# Patient Record
Sex: MALE | Race: WHITE | Employment: UNEMPLOYED | URBAN - METROPOLITAN AREA
[De-identification: names, ages, dates, MRNs, and addresses within clinical notes are randomized per-mention and may not be internally consistent; named-entity substitution may affect disease eponyms.]

---

## 2021-02-17 ENCOUNTER — HOSPITAL ENCOUNTER (EMERGENCY)
Age: 56
Discharge: HOME | End: 2021-02-17
Attending: EMERGENCY MEDICINE
Payer: COMMERCIAL

## 2021-02-17 VITALS
RESPIRATION RATE: 18 BRPM | HEART RATE: 89 BPM | WEIGHT: 200 LBS | BODY MASS INDEX: 31.39 KG/M2 | SYSTOLIC BLOOD PRESSURE: 126 MMHG | DIASTOLIC BLOOD PRESSURE: 84 MMHG | OXYGEN SATURATION: 96 % | TEMPERATURE: 98.5 F | HEIGHT: 67 IN

## 2021-02-17 DIAGNOSIS — R11.0 NAUSEA: Primary | ICD-10-CM

## 2021-02-17 LAB
A/G RATIO: 1.8 (ref 1.1–2.2)
ALBUMIN SERPL-MCNC: 4.8 G/DL (ref 3.4–5)
ALP BLD-CCNC: 53 U/L (ref 40–129)
ALT SERPL-CCNC: 42 U/L (ref 10–40)
ANION GAP SERPL CALCULATED.3IONS-SCNC: 9 MMOL/L (ref 3–16)
AST SERPL-CCNC: 23 U/L (ref 15–37)
BASOPHILS ABSOLUTE: 0.1 K/UL (ref 0–0.2)
BASOPHILS RELATIVE PERCENT: 0.8 %
BILIRUB SERPL-MCNC: 0.5 MG/DL (ref 0–1)
BILIRUBIN URINE: NEGATIVE
BLOOD, URINE: NEGATIVE
BUN BLDV-MCNC: 13 MG/DL (ref 7–20)
CALCIUM SERPL-MCNC: 9.1 MG/DL (ref 8.3–10.6)
CHLORIDE BLD-SCNC: 105 MMOL/L (ref 99–110)
CLARITY: CLEAR
CO2: 26 MMOL/L (ref 21–32)
COLOR: YELLOW
CREAT SERPL-MCNC: 1.2 MG/DL (ref 0.9–1.3)
EKG ATRIAL RATE: 76 BPM
EKG DIAGNOSIS: NORMAL
EKG P AXIS: 47 DEGREES
EKG P-R INTERVAL: 170 MS
EKG Q-T INTERVAL: 370 MS
EKG QRS DURATION: 66 MS
EKG QTC CALCULATION (BAZETT): 416 MS
EKG R AXIS: -12 DEGREES
EKG T AXIS: 32 DEGREES
EKG VENTRICULAR RATE: 76 BPM
EOSINOPHILS ABSOLUTE: 0.3 K/UL (ref 0–0.6)
EOSINOPHILS RELATIVE PERCENT: 2.7 %
GFR AFRICAN AMERICAN: >60
GFR NON-AFRICAN AMERICAN: >60
GLOBULIN: 2.7 G/DL
GLUCOSE BLD-MCNC: 145 MG/DL (ref 70–99)
GLUCOSE URINE: NEGATIVE MG/DL
HCT VFR BLD CALC: 43.4 % (ref 40.5–52.5)
HEMOGLOBIN: 14.7 G/DL (ref 13.5–17.5)
KETONES, URINE: NEGATIVE MG/DL
LEUKOCYTE ESTERASE, URINE: NEGATIVE
LIPASE: 32 U/L (ref 13–60)
LYMPHOCYTES ABSOLUTE: 2.8 K/UL (ref 1–5.1)
LYMPHOCYTES RELATIVE PERCENT: 30 %
MCH RBC QN AUTO: 31.1 PG (ref 26–34)
MCHC RBC AUTO-ENTMCNC: 34 G/DL (ref 31–36)
MCV RBC AUTO: 91.6 FL (ref 80–100)
MICROSCOPIC EXAMINATION: NORMAL
MONOCYTES ABSOLUTE: 0.8 K/UL (ref 0–1.3)
MONOCYTES RELATIVE PERCENT: 8.5 %
NEUTROPHILS ABSOLUTE: 5.4 K/UL (ref 1.7–7.7)
NEUTROPHILS RELATIVE PERCENT: 58 %
NITRITE, URINE: NEGATIVE
PDW BLD-RTO: 13 % (ref 12.4–15.4)
PH UA: 6.5 (ref 5–8)
PLATELET # BLD: 248 K/UL (ref 135–450)
PMV BLD AUTO: 7.8 FL (ref 5–10.5)
POTASSIUM SERPL-SCNC: 3.9 MMOL/L (ref 3.5–5.1)
PROTEIN UA: NEGATIVE MG/DL
RBC # BLD: 4.74 M/UL (ref 4.2–5.9)
SODIUM BLD-SCNC: 140 MMOL/L (ref 136–145)
SPECIFIC GRAVITY UA: 1.02 (ref 1–1.03)
TOTAL PROTEIN: 7.5 G/DL (ref 6.4–8.2)
TROPONIN: <0.01 NG/ML
URINE REFLEX TO CULTURE: NORMAL
URINE TYPE: NORMAL
UROBILINOGEN, URINE: 0.2 E.U./DL
WBC # BLD: 9.3 K/UL (ref 4–11)

## 2021-02-17 PROCEDURE — 81003 URINALYSIS AUTO W/O SCOPE: CPT

## 2021-02-17 PROCEDURE — 85025 COMPLETE CBC W/AUTO DIFF WBC: CPT

## 2021-02-17 PROCEDURE — 99284 EMERGENCY DEPT VISIT MOD MDM: CPT

## 2021-02-17 PROCEDURE — 93005 ELECTROCARDIOGRAM TRACING: CPT | Performed by: EMERGENCY MEDICINE

## 2021-02-17 PROCEDURE — 93010 ELECTROCARDIOGRAM REPORT: CPT | Performed by: INTERNAL MEDICINE

## 2021-02-17 PROCEDURE — 6360000002 HC RX W HCPCS: Performed by: EMERGENCY MEDICINE

## 2021-02-17 PROCEDURE — 84484 ASSAY OF TROPONIN QUANT: CPT

## 2021-02-17 PROCEDURE — 80053 COMPREHEN METABOLIC PANEL: CPT

## 2021-02-17 PROCEDURE — 96374 THER/PROPH/DIAG INJ IV PUSH: CPT

## 2021-02-17 PROCEDURE — 83690 ASSAY OF LIPASE: CPT

## 2021-02-17 RX ORDER — ONDANSETRON 4 MG/1
4 TABLET, ORALLY DISINTEGRATING ORAL EVERY 8 HOURS PRN
Qty: 20 TABLET | Refills: 0 | Status: SHIPPED | OUTPATIENT
Start: 2021-02-17

## 2021-02-17 RX ORDER — ONDANSETRON 2 MG/ML
4 INJECTION INTRAMUSCULAR; INTRAVENOUS ONCE
Status: COMPLETED | OUTPATIENT
Start: 2021-02-17 | End: 2021-02-17

## 2021-02-17 RX ADMIN — ONDANSETRON 4 MG: 2 INJECTION INTRAMUSCULAR; INTRAVENOUS at 03:34

## 2021-02-17 ASSESSMENT — ENCOUNTER SYMPTOMS
SORE THROAT: 0
SHORTNESS OF BREATH: 0
NAUSEA: 1
EYE PAIN: 0
DIARRHEA: 0
ABDOMINAL PAIN: 0
VOMITING: 0

## 2021-02-17 NOTE — ED PROVIDER NOTES
[address] [facility]  ED  eMERGENCY dEPARTMENTeNCOUnter      Pt Name: Simran Do  MRN: [id number]  [place] 1965  Date of evaluation: 2/17/2021  Provider: Nilsa Davenport MD    CHIEF COMPLAINT       Chief Complaint   Patient presents with    Abdominal Pain     states has not been able to eat since around 1800 tonight. \" has not had an appetite. \" states stomach does not hurt anymore but was bad earlier. denies vmoiting. given 4 mg iv zofran         HISTORY OF PRESENT ILLNESS   (Location/Symptom, Timing/Onset,Context/Setting, Quality, Duration, Modifying Factors, Severity)  Note limiting factors. Simran Do is a 48 y.o. male who presents to the emergency department for nausea. The patient states that he has been nauseated since this morning which has affected his ability to eat. Because he is diabetic when he is unable to eat he feels like he gets numbness to his back. The patient is concerned because throughout the day he has eaten less than normal whenever he is trying to eat he became more nauseated. He is not having any abdominal pain he has not vomited has not been sick and has no other complaints. Nursing notes were reviewed. REVIEW OF SYSTEMS    (2-9 systems for level 4, 10 or more for level 5)     Review of Systems   Constitutional: Negative for fatigue and fever. HENT: Negative for sore throat. Eyes: Negative for pain. Respiratory: Negative for shortness of breath. Cardiovascular: Negative for chest pain. Gastrointestinal: Positive for nausea. Negative for abdominal pain, diarrhea and vomiting. Endocrine: Negative for polyuria. Genitourinary: Negative for dysuria. Musculoskeletal: Negative for myalgias. Skin: Negative for rash. Neurological:        Tingling to the back   Hematological: Does not bruise/bleed easily. Positive and pertinent negative as per HPI.   Except as noted above in the ROS, all other systems were reviewed and were negative. PAST MEDICAL HISTORY     Past Medical History:   Diagnosis Date    Diabetes mellitus ([facility] [address]          SURGICALHISTORY       Past Surgical History:   Procedure Laterality Date    CATARACT REMOVAL      EYE SURGERY      cataract         CURRENT MEDICATIONS       Discharge Medication List as of 2021  5:21 AM      CONTINUE these medications which have NOT CHANGED    Details   meclizine (ANTIVERT) 25 MG tablet Take 1 tablet by mouth 3 times daily as needed for Dizziness, Disp-30 tablet, R-1             ALLERGIES     Patient has no known allergies.     FAMILY HISTORY       Family History   Adopted: Yes   Problem Relation Age of Onset    Diabetes Mother     Substance Abuse Mother           SOCIAL HISTORY       Social History     Socioeconomic History    Marital status: Single     Spouse name: None    Number of children: None    Years of education: None    Highest education level: None   Occupational History    None   Social Needs    Financial resource strain: None    Food insecurity     Worry: None     Inability: None    Transportation needs     Medical: None     Non-medical: None   Tobacco Use    Smoking status: Former Smoker     Start date: 1977     Quit date: 1983     Years since quittin.1    Smokeless tobacco: Never Used   Substance and Sexual Activity    Alcohol use: No     Comment: used alcohol as teen    Drug use: No     Comment: used pot as teen    Sexual activity: Never   Lifestyle    Physical activity     Days per week: None     Minutes per session: None    Stress: None   Relationships    Social connections     Talks on phone: None     Gets together: None     Attends [employer] service: None     Active member of club or organization: None     Attends meetings of clubs or organizations: None     Relationship status: None    Intimate partner violence     Fear of current or ex partner: None     Emotionally abused: None     Physically abused: None     Forced sexual activity: None   Other Topics Concern    None   Social History Narrative    None       SCREENINGS             PHYSICAL EXAM    (up to 7 for level 4, 8 or more for level 5)     ED Triage Vitals   BP Temp Temp src Pulse Resp SpO2 Height Weight   02/17/21 0134 02/17/21 0132 -- 02/17/21 0134 02/17/21 0134 02/17/21 0134 02/17/21 0132 02/17/21 0132   (!) 158/95 98.5 °F (36.9 °C)  86 16 96 % 5' 7\" (1.702 m) 200 lb (90.7 kg)       Physical Exam  Vitals signs and nursing note reviewed. Constitutional:       Appearance: Normal appearance. He is well-developed. He is not ill-appearing. HENT:      Head: Normocephalic and atraumatic. Right Ear: External ear normal.      Left Ear: External ear normal.      Nose: Nose normal.   Eyes:      General: No scleral icterus. Right eye: No discharge. Left eye: No discharge. Conjunctiva/sclera: Conjunctivae normal.   Neck:      Musculoskeletal: Neck supple. Cardiovascular:      Rate and Rhythm: Normal rate and regular rhythm. Heart sounds: Normal heart sounds. Pulmonary:      Effort: Pulmonary effort is normal. No respiratory distress. Breath sounds: Normal breath sounds. No wheezing or rales. Abdominal:      General: Bowel sounds are normal. There is no distension. Palpations: Abdomen is soft. Tenderness: There is no abdominal tenderness. There is no guarding or rebound. Musculoskeletal:         General: No swelling, tenderness, deformity or signs of injury. Skin:     Coloration: Skin is not pale. Neurological:      Mental Status: He is alert.    Psychiatric:         Mood and Affect: Mood normal.         Behavior: Behavior normal.             DIAGNOSTIC RESULTS     EKG: All EKG's are interpreted by the Emergency Department Physician who either signs or Co-signs this chart in the absence of a cardiologist.    12 lead EKG shows normal sinus rhythm at a rate of 76 beats minute, AZ interval QRS QTC normal.  Normal axis no acute ischemic findings. No significant changes when compared to February 2016. RADIOLOGY:   Non-plain film images such as CT, Ultrasound and MRI are read by the radiologist. Plain radiographic images are visualized and preliminarily interpreted by the emergency physician with the below findings:        Interpretation per the Radiologist below, if available at the time of this note:    No orders to display         ED BEDSIDE ULTRASOUND:   Performed by ED Physician - none    LABS:  Labs Reviewed   COMPREHENSIVE METABOLIC PANEL - Abnormal; Notable for the following components:       Result Value    Glucose 145 (*)     ALT 42 (*)     All other components within normal limits    Narrative:     Performed at:  [place] [address] [employer]   Phone [phone]   CBC WITH AUTO DIFFERENTIAL    Narrative:     Performed at:  [place] [address] [employer]   Phone [phone]   LIPASE    Narrative:     Performed at:  [place] [address] [employer]   Phone [phone]   URINE RT REFLEX TO CULTURE    Narrative:     Performed at:  [place] [address] [employer]   Phone [phone]   TROPONIN    Narrative:     Performed at:  [place] [address] [employer]   Phone [phone]       All other labs were within normal range or not returned as of this dictation. EMERGENCY DEPARTMENT COURSE and DIFFERENTIAL DIAGNOSIS/MDM:   Vitals:    Vitals:    02/17/21 0143 02/17/21 0243 02/17/21 0339 02/17/21 0439   BP: 137/89 124/86 117/65 126/84   Pulse: 80 75 76 89   Resp: 16 16 14 18   Temp:       SpO2: 97% 97% 97% 96%   Weight:       Height:           Adult male who comes in of nausea. Patient has not actually vomited.   He is concerned because he has not been able to eat and he is diabetic. Basic laboratory studies ordered patient is given Zofran. Patient is reassessed no vomiting he is able to tolerate a p.o. challenge. EKG and troponin were additionally done because of his underlying risk factors but he does not have any chest pain or shortness of breath. Troponin and EKG negative. I discussed all findings with the patient and I recommend he follows up with his primary care provider. Zofran ordered and patient encouraged to eat as tolerated. CRITICAL CARE TIME   None       CONSULTS:  None    PROCEDURES:  Unless otherwise noted above, none     Procedures    FINAL IMPRESSION      1.  Nausea          DISPOSITION/PLAN   DISPOSITION Decision To Discharge 02/17/2021 03:55:09 AM      PATIENT REFERREDTO:  Luzmaria Robles MD  [address] [facility] [address] [phone]    Schedule an appointment as soon as possible for a visit in 2 days        DISCHARGEMEDICATIONS:  Discharge Medication List as of 2/17/2021  5:21 AM      START taking these medications    Details   ondansetron (ZOFRAN ODT) 4 MG disintegrating tablet Take 1 tablet by mouth every 8 hours as needed for Nausea, Disp-20 tablet, R-0Normal                (Please note that portions of this note were completed with a voice recognition program.  Efforts were made to edit the dictations but occasionally words are mis-transcribed.)    Heron Camejo MD (electronically signed)  Attending Emergency Physician        Heron Camejo MD  02/17/21 2234

## 2021-02-17 NOTE — ED NOTES
All discharge paperwork and follow-up instructions reviewed with pt. Pt instructed to  Zofran prescription from [facility]. Pt verbalized understanding. Pt ambulatory upon discharge in stable condition to private vehicle with Friend.        Rigoberto Sherwood RN  02/17/21 7909

## 2021-02-17 NOTE — ED NOTES
Pt tolerated PO challenge. Notified Dr. Anna Casper.      Chang Bonilla RN  02/17/21 4178 Abdomen soft, non-tender, no guarding.

## 2021-02-17 NOTE — ED NOTES
Provided pt with saltine crackers, jello and water for PO challenge per Dr. Kelly George verbal order.      Rita Greco RN  02/17/21 9842

## 2021-08-09 ENCOUNTER — HOSPITAL ENCOUNTER (EMERGENCY)
Age: 56
Discharge: HOME | End: 2021-08-09
Payer: COMMERCIAL

## 2021-08-09 ENCOUNTER — APPOINTMENT (OUTPATIENT)
Dept: GENERAL RADIOLOGY | Age: 56
End: 2021-08-09
Payer: COMMERCIAL

## 2021-08-09 VITALS
TEMPERATURE: 98.9 F | RESPIRATION RATE: 18 BRPM | HEART RATE: 85 BPM | OXYGEN SATURATION: 96 % | SYSTOLIC BLOOD PRESSURE: 132 MMHG | DIASTOLIC BLOOD PRESSURE: 86 MMHG

## 2021-08-09 DIAGNOSIS — U07.1 COVID-19: Primary | ICD-10-CM

## 2021-08-09 DIAGNOSIS — R05.9 COUGH: ICD-10-CM

## 2021-08-09 DIAGNOSIS — R50.9 ACUTE FEBRILE ILLNESS: ICD-10-CM

## 2021-08-09 LAB
A/G RATIO: 1.4 (ref 1.1–2.2)
ALBUMIN SERPL-MCNC: 4.5 G/DL (ref 3.4–5)
ALP BLD-CCNC: 57 U/L (ref 40–129)
ALT SERPL-CCNC: 31 U/L (ref 10–40)
ANION GAP SERPL CALCULATED.3IONS-SCNC: 11 MMOL/L (ref 3–16)
AST SERPL-CCNC: 26 U/L (ref 15–37)
BASOPHILS ABSOLUTE: 0 K/UL (ref 0–0.2)
BASOPHILS RELATIVE PERCENT: 0.6 %
BILIRUB SERPL-MCNC: 0.4 MG/DL (ref 0–1)
BUN BLDV-MCNC: 11 MG/DL (ref 7–20)
CALCIUM SERPL-MCNC: 8.7 MG/DL (ref 8.3–10.6)
CHLORIDE BLD-SCNC: 103 MMOL/L (ref 99–110)
CO2: 23 MMOL/L (ref 21–32)
CREAT SERPL-MCNC: 1.1 MG/DL (ref 0.9–1.3)
EOSINOPHILS ABSOLUTE: 0 K/UL (ref 0–0.6)
EOSINOPHILS RELATIVE PERCENT: 0.2 %
GFR AFRICAN AMERICAN: >60
GFR NON-AFRICAN AMERICAN: >60
GLOBULIN: 3.3 G/DL
GLUCOSE BLD-MCNC: 138 MG/DL (ref 70–99)
HCT VFR BLD CALC: 43 % (ref 40.5–52.5)
HEMOGLOBIN: 15.2 G/DL (ref 13.5–17.5)
LYMPHOCYTES ABSOLUTE: 0.6 K/UL (ref 1–5.1)
LYMPHOCYTES RELATIVE PERCENT: 15.6 %
MCH RBC QN AUTO: 31.8 PG (ref 26–34)
MCHC RBC AUTO-ENTMCNC: 35.4 G/DL (ref 31–36)
MCV RBC AUTO: 89.7 FL (ref 80–100)
MONOCYTES ABSOLUTE: 0.3 K/UL (ref 0–1.3)
MONOCYTES RELATIVE PERCENT: 8.2 %
NEUTROPHILS ABSOLUTE: 3.1 K/UL (ref 1.7–7.7)
NEUTROPHILS RELATIVE PERCENT: 75.4 %
PDW BLD-RTO: 12.5 % (ref 12.4–15.4)
PLATELET # BLD: 142 K/UL (ref 135–450)
PMV BLD AUTO: 8.2 FL (ref 5–10.5)
POTASSIUM SERPL-SCNC: 4 MMOL/L (ref 3.5–5.1)
RBC # BLD: 4.79 M/UL (ref 4.2–5.9)
SARS-COV-2, NAAT: DETECTED
SODIUM BLD-SCNC: 137 MMOL/L (ref 136–145)
TOTAL PROTEIN: 7.8 G/DL (ref 6.4–8.2)
WBC # BLD: 4.1 K/UL (ref 4–11)

## 2021-08-09 PROCEDURE — 6370000000 HC RX 637 (ALT 250 FOR IP): Performed by: PHYSICIAN ASSISTANT

## 2021-08-09 PROCEDURE — 71046 X-RAY EXAM CHEST 2 VIEWS: CPT

## 2021-08-09 PROCEDURE — 99285 EMERGENCY DEPT VISIT HI MDM: CPT

## 2021-08-09 PROCEDURE — 87635 SARS-COV-2 COVID-19 AMP PRB: CPT

## 2021-08-09 PROCEDURE — 6370000000 HC RX 637 (ALT 250 FOR IP): Performed by: NURSE PRACTITIONER

## 2021-08-09 PROCEDURE — 80053 COMPREHEN METABOLIC PANEL: CPT

## 2021-08-09 PROCEDURE — 85025 COMPLETE CBC W/AUTO DIFF WBC: CPT

## 2021-08-09 RX ORDER — ALBUTEROL SULFATE 90 UG/1
2 AEROSOL, METERED RESPIRATORY (INHALATION) EVERY 4 HOURS PRN
Qty: 1 INHALER | Refills: 0 | Status: SHIPPED | OUTPATIENT
Start: 2021-08-09

## 2021-08-09 RX ORDER — IBUPROFEN 400 MG/1
400 TABLET ORAL ONCE
Status: COMPLETED | OUTPATIENT
Start: 2021-08-09 | End: 2021-08-09

## 2021-08-09 RX ORDER — ACETAMINOPHEN 325 MG/1
650 TABLET ORAL ONCE
Status: COMPLETED | OUTPATIENT
Start: 2021-08-09 | End: 2021-08-09

## 2021-08-09 RX ORDER — BENZONATATE 100 MG/1
100 CAPSULE ORAL 3 TIMES DAILY PRN
Qty: 21 CAPSULE | Refills: 0 | Status: SHIPPED | OUTPATIENT
Start: 2021-08-09 | End: 2021-08-16

## 2021-08-09 RX ORDER — ACETAMINOPHEN 500 MG
1000 TABLET ORAL ONCE
Status: COMPLETED | OUTPATIENT
Start: 2021-08-09 | End: 2021-08-09

## 2021-08-09 RX ADMIN — ACETAMINOPHEN 650 MG: 325 TABLET, FILM COATED ORAL at 19:54

## 2021-08-09 RX ADMIN — IBUPROFEN 400 MG: 400 TABLET ORAL at 21:11

## 2021-08-09 RX ADMIN — ACETAMINOPHEN 1000 MG: 500 TABLET ORAL at 21:11

## 2021-08-09 ASSESSMENT — PAIN SCALES - GENERAL
PAINLEVEL_OUTOF10: 1
PAINLEVEL_OUTOF10: 1
PAINLEVEL_OUTOF10: 0

## 2021-08-09 ASSESSMENT — ENCOUNTER SYMPTOMS
SORE THROAT: 1
NAUSEA: 0
COUGH: 0
COLOR CHANGE: 0
WHEEZING: 0
DIARRHEA: 0
SHORTNESS OF BREATH: 1
VOMITING: 0
BACK PAIN: 0
ABDOMINAL PAIN: 0

## 2021-08-10 ENCOUNTER — CARE COORDINATION (OUTPATIENT)
Dept: CARE COORDINATION | Age: 56
End: 2021-08-10

## 2021-08-10 NOTE — ED PROVIDER NOTES
**ADVANCED PRACTICE PROVIDER, I HAVE EVALUATED THIS [facility]  ED  EMERGENCY DEPARTMENT ENCOUNTER      Pt Name: Gwyn Gruber  KBW:[id number]  [place] 1965  Date of evaluation: 8/9/2021  Provider: ANUPAMA Loya CNP      Chief Complaint:    Chief Complaint   Patient presents with    Fever     1 week ago from sunday pt was exposed to covid, + throat pain, fever, began 3 days ago.  + cough short of breath. denies chest pain.  Dizziness    Nausea         Nursing Notes, Past Medical Hx, Past Surgical Hx, Social Hx, Allergies, and Family Hx were all reviewed and agreed with or any disagreements were addressed in the HPI.    HPI: (Location, Duration, Timing, Severity, Quality, Assoc Sx, Context, Modifying factors)    Chief Complaint of shortness of breath, cough, tactile fever and chills    This is a  48 y.o. male who presents today with a 5-day history of cough, shortness of breath, sore throat, tactile fever and chills. States he believes on Sunday last week he was exposed to COVID-19 as his \"friend has it\". States that he was walking in his house when he started to feel very short of breath and felt like he was going to pass out, he then called 911. He denies any syncopal episodes, no lightheadedness or dizziness on exam.  Patient denies any chest pain pleuritic chest pain, no abdominal pain, no nausea vomiting or diarrhea. He denies any difficulty breathing, he does complain of a sore throat that he rates a 1-10. He denies any abdominal pain, no nausea vomiting or diarrhea. He denies any medications, no additional symptoms, no additional complaints. Patient presents awake, alert and in no acute respiratory stress or toxic appearance.     PastMedical/Surgical History:      Diagnosis Date    Diabetes mellitus ([address]          Procedure Laterality Date    CATARACT REMOVAL      EYE SURGERY      cataract       Medications:  Previous Medications MECLIZINE (ANTIVERT) 25 MG TABLET    Take 1 tablet by mouth 3 times daily as needed for Dizziness    ONDANSETRON (ZOFRAN ODT) 4 MG DISINTEGRATING TABLET    Take 1 tablet by mouth every 8 hours as needed for Nausea         Review of Systems:  (2-9 systems needed)  Review of Systems   Constitutional: Positive for chills and fever. Patient complains of tactile fever and chills, thought it was because it was too hot in his house, has not taken his temperature. HENT: Positive for sore throat. Negative for congestion. He reports an occasional sore throat but denies any difficulty swallowing. Respiratory: Positive for shortness of breath. Negative for cough and wheezing. Patient complains of 5-day history of cough, shortness of breath, sore throat, tactile fever and chills. States he believes on Sunday last week he was exposed to COVID-19 as his \"friend has it\". States that he was walking in his house when he started to feel very short of breath and felt like he was going to pass out, he then called 911. Cardiovascular: Negative for chest pain. Gastrointestinal: Negative for abdominal pain, diarrhea, nausea and vomiting. Genitourinary: Negative for difficulty urinating, dysuria and frequency. Musculoskeletal: Negative for back pain. Skin: Negative for color change. Neurological: Negative for weakness, numbness and headaches. Denies any headache neck pain or neck stiffness. No lightheadedness or dizziness on exam.       \"Positives and Pertinent negatives as per HPI\"    Physical Exam:  Physical Exam  Vitals and nursing note reviewed. Constitutional:       Appearance: He is well-developed. He is not diaphoretic. HENT:      Head: Normocephalic. Right Ear: External ear normal.      Left Ear: External ear normal.   Eyes:      General: No scleral icterus. Right eye: No discharge. Left eye: No discharge.       Pupils: Pupils are equal, round, and reactive to on Sunday last week he was exposed to COVID-19 as his \"friend has it\". States that he was walking in his house when he started to feel very short of breath and felt like he was going to pass out, he then called 911. After evaluation and examination patient IV access, blood work, chest x-ray, Covid testing Tylenol Motrin ordered for his fever. CBC shows no sepsis or anemia. Metabolic panel shows no electrolyte disturbances or renal insufficiency. Liver functions are normal.  Chest x-ray shows no acute cardiopulmonary findings. Covid test is positive for COVID-19. Upon reevaluation vital signs are stable, patient has not had any episodes of hypoxia while here in the department. Initially had a temperature of 102.9. He was given antipyretics, he is not tachycardic, no acute respiratory distress, temperature is improved 100.5. I did educate him about increasing fluids, rest, 14-day isolation. However, he seems to be upset, stating \"you need to fix this\". I informed him that there is no specific medication to treat COVID-19. The patient has COVID-19, but does not have HYPOXIA, TACHYCARDIA, TACHYPNEA, VOMITING, SIGNIFICANT CO-MORBIDITIES, TOXICITY, OR SEVERE SEPSIS, thus I consider the discharge disposition reasonable. Therefore, shared medical decision was made to the patient myself we agreed that he would be discharged home with outpatient follow-up. He was educated to alternate Tylenol and Motrin every 4 hours. He was educated that he can take vitamin C and zinc to try to help as well. However, increase fluids and rest.  The patient tolerated their visit well. I evaluated the patient. The physician was available for consultation as needed. The patient and / or the family were informed of the results of any tests, a time was given to answer questions, a plan was proposed and they agreed with plan.   Patient verbalized understanding of discharge instructions and was discharged from the department in stable condition peer    CLINICAL IMPRESSION:  1. COVID-19    2. Cough    3. Acute febrile illness        DISPOSITION        PATIENT REFERRED TO:  MD Chely Karimi[place]inny [place] [id number]  [phone]    In 3 days  Follow-up with PCP in the next 2 to 3 days for reevaluation    [facility]  ED  [facility] [address]  [phone]  Go to   If symptoms worsen      DISCHARGE MEDICATIONS:  New Prescriptions    ALBUTEROL SULFATE HFA (PROVENTIL HFA) 108 (90 BASE) MCG/ACT INHALER    Inhale 2 puffs into the lungs every 4 hours as needed for Wheezing or Shortness of Breath (Space out to every 6 hours as symptoms improve) Space out to every 6 hours as symptoms improve.     BENZONATATE (TESSALON PERLES) 100 MG CAPSULE    Take 1 capsule by mouth 3 times daily as needed for Cough       DISCONTINUED MEDICATIONS:  Discontinued Medications    No medications on file              (Please note the MDM and HPI sections of this note were completed with a voice recognition program.  Efforts were made to edit the dictations but occasionally words are mis-transcribed.)    Electronically signed, ANUPAMA Tiwari CNP,        ANUPAMA Tiwari CNP  08/09/21 9499

## 2021-08-10 NOTE — CARE COORDINATION
Patient contacted regarding COVID-19 diagnosis. Discussed COVID-19 related testing which was available at this time. Test results were positive. Patient informed of results, if available? Yes. Ambulatory Care Manager contacted the patient by telephone to perform post discharge assessment. Call within 2 business days of discharge: Yes. Verified name and  with patient as identifiers. Provided introduction to self, and explanation of the CTN/ACM role, and reason for call due to risk factors for infection and/or exposure to COVID-19. Symptoms reviewed with patient who verbalized the following symptoms: fever, fatigue, pain or aching joints, cough, shortness of breath, nausea, no new symptoms, no worsening symptoms and throat pain, dizziness. Due to no new or worsening symptoms encounter was not routed to provider for escalation. Discussed follow-up appointments. If no appointment was previously scheduled, appointment scheduling offered: Yes. [facility] follow up appointment(s): No future appointments. Non-[facility] follow up appointment(s): n/a  Non-face-to-face services provided:  Obtained and reviewed discharge summary and/or continuity of care documents     Advance Care Planning:   Does patient have an Advance Directive:  referral to internal ACP facilitator. Educated patient about risk for severe COVID-19 due to risk factors according to CDC guidelines. ACM reviewed discharge instructions, medical action plan and red flag symptoms with the patient who verbalized understanding. Discussed COVID vaccination status: Yes. Education provided on COVID-19 vaccination as appropriate. Discussed exposure protocols and quarantine with CDC Guidelines. Patient was given an opportunity to verbalize any questions and concerns and agrees to contact ACM or health care provider for questions related to their healthcare.     Reviewed and educated patient on any new and changed medications related to discharge diagnosis Was patient discharged with a pulse oximeter? No Discussed and confirmed pulse oximeter discharge instructions and when to notify provider or seek emergency care. ACM provided contact information. Plan for follow-up call in 5-7 days based on severity of symptoms and risk factors. Lengthy conversation with patient discussing his symptoms and life in general.  He is concerned that he lives alone and if worsened with Covid where would he get help? He says he feels better than when in ED. His friend is going to get his medication and food. He said he did not get groceries when he became ill. Complaining of headache. Discussed taking ibuprofen, tylenol and ice bag on back of neck. He has been taking aspirin with minimal relief. Encouraged patient to FU with his PCP Dr. Lupis Purcell.    Plan  ED FU in 5-7 days    Alicia Ferro.  Isabel Ford, RN, BSN, [address] [place] [address] Primary Care  [phone]

## 2021-08-10 NOTE — CARE COORDINATION
Received incoming phone call from patient. He could not find his reading glasses. He had OTC zinc and vitamin C. His AVS just said to take them and not directions for dosage. Discussed directions with patient. He was satisfied and would also alternate tylenol and motrin as directed on AVS.    Plan  ED FU in one week    Radha James RN, BSN, [address] [place] [address] Primary Care  [phone]

## 2021-08-11 ENCOUNTER — CARE COORDINATION (OUTPATIENT)
Dept: CARE COORDINATION | Age: 56
End: 2021-08-11

## 2021-08-16 ENCOUNTER — CARE COORDINATION (OUTPATIENT)
Dept: CARE COORDINATION | Age: 56
End: 2021-08-16

## 2021-08-16 NOTE — CARE COORDINATION
Placed first phone call to patient for the purpose of 5-7 day ED FU and he was not available. LM and provided contact information. Plan  Make second phone call    Mario Hernandez.  Anne White RN, BSN, [address] [place] [address] Primary Care  [phone]

## 2021-08-17 ENCOUNTER — CARE COORDINATION (OUTPATIENT)
Dept: CARE COORDINATION | Age: 56
End: 2021-08-17

## 2021-08-17 NOTE — CARE COORDINATION
Placed second phone call to reach patient for the purpose of 5-7 day ED FU and he was not available. LM and provided contact information. Plan  ED FU in one week    Torsten Henry.  Oh Graves RN, BSN, [address] [place] [address] Primary Care  [phone]

## 2021-08-24 ENCOUNTER — CARE COORDINATION (OUTPATIENT)
Dept: CARE COORDINATION | Age: 56
End: 2021-08-24

## 2021-08-24 NOTE — CARE COORDINATION
Phone call placed to patient for the purpose of 14 day ED FU and he was not available. LM and provided contact information. Plan  Make second and final phone call    Brandie Benson.  Leigh Ann Jefferson, RN, BSN, [address] [place] [address] Care  [phone]

## 2021-08-25 ENCOUNTER — CARE COORDINATION (OUTPATIENT)
Dept: CARE COORDINATION | Age: 56
End: 2021-08-25